# Patient Record
Sex: FEMALE | NOT HISPANIC OR LATINO | URBAN - METROPOLITAN AREA
[De-identification: names, ages, dates, MRNs, and addresses within clinical notes are randomized per-mention and may not be internally consistent; named-entity substitution may affect disease eponyms.]

---
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
## 2025-05-22 ENCOUNTER — APPOINTMENT (OUTPATIENT)
Dept: OBSTETRICS AND GYNECOLOGY | Facility: CLINIC | Age: 20
End: 2025-05-22
Payer: COMMERCIAL

## 2025-05-22 VITALS
WEIGHT: 153.2 LBS | BODY MASS INDEX: 28.92 KG/M2 | SYSTOLIC BLOOD PRESSURE: 110 MMHG | DIASTOLIC BLOOD PRESSURE: 74 MMHG | HEIGHT: 61 IN

## 2025-05-22 DIAGNOSIS — N92.0 MENORRHAGIA WITH REGULAR CYCLE: Primary | ICD-10-CM

## 2025-05-22 DIAGNOSIS — Z30.011 ENCOUNTER FOR INITIAL PRESCRIPTION OF CONTRACEPTIVE PILLS: ICD-10-CM

## 2025-05-22 DIAGNOSIS — Z20.2 POSSIBLE EXPOSURE TO STD: ICD-10-CM

## 2025-05-22 PROCEDURE — 1036F TOBACCO NON-USER: CPT | Performed by: ADVANCED PRACTICE MIDWIFE

## 2025-05-22 PROCEDURE — 99202 OFFICE O/P NEW SF 15 MIN: CPT | Performed by: ADVANCED PRACTICE MIDWIFE

## 2025-05-22 PROCEDURE — 3008F BODY MASS INDEX DOCD: CPT | Performed by: ADVANCED PRACTICE MIDWIFE

## 2025-05-22 RX ORDER — DESOGESTREL AND ETHINYL ESTRADIOL 0.15-0.03
1 KIT ORAL DAILY
Qty: 84 TABLET | Refills: 4 | Status: SHIPPED | OUTPATIENT
Start: 2025-05-22 | End: 2026-05-22

## 2025-05-22 ASSESSMENT — ENCOUNTER SYMPTOMS
CONSTIPATION: 1
VOMITING: 0
NAUSEA: 0
FLANK PAIN: 0
DIARRHEA: 0
ANOREXIA: 1
SORE THROAT: 0
DYSURIA: 0
BACK PAIN: 0
HEMATURIA: 0
CHILLS: 0
ABDOMINAL PAIN: 1
HEADACHES: 1
FEVER: 0
FREQUENCY: 1

## 2025-05-22 NOTE — PROGRESS NOTES
"GYNECOLOGY PROGRESS NOTE        CC:  heavy menstrual bleeding   Chief Complaint   Patient presents with    New Patient Visit     Patient would like to discuss heavy periods and very bad cramps - also gets burning sensation vaginal area all the time - \"feels like a hot tacho that someone is holding it\" before it would happen around her periods but lately it happens every other day.  Sexually active with a woman        HPI:  21 y/o presents for chronic heavy menstrual bleeding. Onset of heavy periods over the last 2 years. First 2 days are the heaviest and pain 9/10. LMP 5/2/25. Periods are irregular, occurring approximately q5-6 weeks. Periods are usually 6-7d. She's uses feminine pads and changes 1-2x/d. Passing clots the size of marbles. She takes IBU, Tylenol and Midol for symptom management as needed. Open to trying birth control to help shorten and lighten her periods. Patient reports burning sensation of her groin. Denies open sores to the area. Report excessive vaginal discharge and odor.        ROS:  GYN - see HPI        PHYSICAL EXAM:  /74 (BP Location: Left arm, Patient Position: Sitting)   Ht 1.537 m (5' 0.5\")   Wt 69.5 kg (153 lb 3.2 oz)   LMP 05/02/2025   BMI 29.43 kg/m²   GEN:  A&O, NAD  PSYCH:  normal affect, non-anxious      IMPRESSION/PLAN:  A: presents for chronic heavy menstrual bleeding. Reports burning sensation to groin prior to onset of menses. \"Being poked with fire, every morning\" Denies open sores.  P: STD cultures collected via urine sample. Patient desires to initiate OCP. Take birth control continuously, skipping Follow-up via [facility]. Patient instructed to allow 2-3 cycles for improvement. If no improvement then I will order Pelvic floor PT and if still issues then follow up with  for further evaluation.   Discussion:  contraceptive options for hormonal including R/B/AE/SE   Screened and appropriate for [facility] use. Risks/benefits/side effects/adverse effects. Instructions " for use. Warning S&S including DVT, PE, and Stroke --instructed to proceed to ER.        Problem List Items Addressed This Visit    None        Leslie CHAUHAN     I saw and evaluated the patient, participating in the key portions of the service.  I reviewed the student’s note.  I agree with the student’s findings and plan.   Lauren Bashir CNM    Answers submitted by the patient for this visit:  Female Genital Questionnaire (Submitted on 5/22/2025)  Chief Complaint: Female genitourinary complaint  genital itching: No  genital lesions: No  genital odor: Yes  genital rash: No  missed menses: No  pelvic pain: No  vaginal bleeding: No  vaginal discharge: Yes  Chronicity: chronic  Onset: more than 1 year ago  Frequency: every several days  Progression since onset: gradually worsening  Pain severity: moderate  Affected side: left  Pregnant now?: No  abdominal pain: Yes  anorexia: Yes  back pain: No  chills: No  constipation: Yes  diarrhea: No  discolored urine: No  dysuria: No  fever: No  flank pain: No  frequency: Yes  headaches: Yes  hematuria: No  joint pain: No  joint swelling: No  nausea: No  painful intercourse: No  rash: No  sore throat: No  urgency: Yes  vomiting: No  Aggravated by: tactile pressure  Sexual activity: sexually active  Partner with STD symptoms: no  Birth control: nothing  Menstrual history: irregular  Discharge characteristics: copious, malodorous, milky, white  Passing clots?: Yes  Passing tissue?: Yes

## 2025-05-23 LAB
C TRACH RRNA SPEC QL NAA+PROBE: NOT DETECTED
N GONORRHOEA RRNA SPEC QL NAA+PROBE: NOT DETECTED
QUEST GC CT AMPLIFIED (ALWAYS MESSAGE): NORMAL
T VAGINALIS RRNA SPEC QL NAA+PROBE: NOT DETECTED